# Patient Record
Sex: FEMALE | Race: WHITE | Employment: UNEMPLOYED | URBAN - METROPOLITAN AREA
[De-identification: names, ages, dates, MRNs, and addresses within clinical notes are randomized per-mention and may not be internally consistent; named-entity substitution may affect disease eponyms.]

---

## 2018-11-06 ENCOUNTER — TELEPHONE (OUTPATIENT)
Dept: PHARMACY | Facility: CLINIC | Age: 48
End: 2018-11-06

## 2019-03-13 ENCOUNTER — TELEPHONE (OUTPATIENT)
Dept: NEUROLOGY | Age: 49
End: 2019-03-13

## 2022-01-01 ENCOUNTER — HOSPITAL ENCOUNTER (EMERGENCY)
Age: 52
End: 2022-11-11
Attending: EMERGENCY MEDICINE
Payer: MEDICARE

## 2022-01-01 DIAGNOSIS — I46.9 CARDIORESPIRATORY ARREST (HCC): Primary | ICD-10-CM

## 2022-01-01 PROCEDURE — 42809 REMOVE PHARYNX FOREIGN BODY: CPT

## 2022-01-01 PROCEDURE — 92950 HEART/LUNG RESUSCITATION CPR: CPT

## 2022-01-01 PROCEDURE — 31500 INSERT EMERGENCY AIRWAY: CPT

## 2022-01-01 PROCEDURE — 99283 EMERGENCY DEPT VISIT LOW MDM: CPT

## 2022-07-15 LAB
ANION GAP SERPL CALCULATED.3IONS-SCNC: 11 MMOL/L (ref 7–16)
BASOPHILS ABSOLUTE: 0.06 E9/L (ref 0–0.2)
BASOPHILS RELATIVE PERCENT: 0.7 % (ref 0–2)
BUN BLDV-MCNC: 13 MG/DL (ref 6–20)
CALCIUM SERPL-MCNC: 9.1 MG/DL (ref 8.6–10.2)
CHLORIDE BLD-SCNC: 105 MMOL/L (ref 98–107)
CHOLESTEROL, TOTAL: 154 MG/DL (ref 0–199)
CO2: 27 MMOL/L (ref 22–29)
CREAT SERPL-MCNC: 0.8 MG/DL (ref 0.5–1)
EOSINOPHILS ABSOLUTE: 0.2 E9/L (ref 0.05–0.5)
EOSINOPHILS RELATIVE PERCENT: 2.4 % (ref 0–6)
GFR AFRICAN AMERICAN: >60
GFR NON-AFRICAN AMERICAN: >60 ML/MIN/1.73
GLUCOSE BLD-MCNC: 80 MG/DL (ref 74–99)
HBA1C MFR BLD: 5.3 % (ref 4–5.6)
HCT VFR BLD CALC: 39.3 % (ref 34–48)
HDLC SERPL-MCNC: 54 MG/DL
HEMOGLOBIN: 12.4 G/DL (ref 11.5–15.5)
IMMATURE GRANULOCYTES #: 0.03 E9/L
IMMATURE GRANULOCYTES %: 0.4 % (ref 0–5)
LDL CHOLESTEROL CALCULATED: 74 MG/DL (ref 0–99)
LYMPHOCYTES ABSOLUTE: 3.01 E9/L (ref 1.5–4)
LYMPHOCYTES RELATIVE PERCENT: 36.4 % (ref 20–42)
MCH RBC QN AUTO: 28 PG (ref 26–35)
MCHC RBC AUTO-ENTMCNC: 31.6 % (ref 32–34.5)
MCV RBC AUTO: 88.7 FL (ref 80–99.9)
MONOCYTES ABSOLUTE: 0.51 E9/L (ref 0.1–0.95)
MONOCYTES RELATIVE PERCENT: 6.2 % (ref 2–12)
NEUTROPHILS ABSOLUTE: 4.45 E9/L (ref 1.8–7.3)
NEUTROPHILS RELATIVE PERCENT: 53.9 % (ref 43–80)
PDW BLD-RTO: 14.3 FL (ref 11.5–15)
PLATELET # BLD: 240 E9/L (ref 130–450)
PMV BLD AUTO: 11.1 FL (ref 7–12)
POTASSIUM SERPL-SCNC: 3.6 MMOL/L (ref 3.5–5)
RBC # BLD: 4.43 E12/L (ref 3.5–5.5)
SODIUM BLD-SCNC: 143 MMOL/L (ref 132–146)
TRIGL SERPL-MCNC: 128 MG/DL (ref 0–149)
TSH SERPL DL<=0.05 MIU/L-ACNC: 1.26 UIU/ML (ref 0.27–4.2)
VITAMIN D 25-HYDROXY: 59 NG/ML (ref 30–100)
VLDLC SERPL CALC-MCNC: 26 MG/DL
WBC # BLD: 8.3 E9/L (ref 4.5–11.5)

## 2022-07-18 LAB — KEPPRA: 61 UG/ML (ref 10–40)

## 2022-09-04 LAB — KEPPRA: 41 UG/ML (ref 10–40)

## 2022-11-11 NOTE — CARE COORDINATION
Social Work/ Transition of Care:    Pt presents to the ED secondary to cardiac arrest at [address] [place] [address]. Per report, pt was choking at the facility. Pt has hx of MS. Pt  in ED. [place] notified.

## 2022-11-11 NOTE — ED NOTES
Call received from [employer], they will not be following pt, ok to release      Peggy Dial RN  11/11/22 9521

## 2022-11-11 NOTE — ED PROVIDER NOTES
HPI  45 y.o. female brought by EMS in cardiac arrest. Symptoms have been present for 35-40 minutes prior to arrival, began suddenly. . They have been constant and severe in severity. They are worsened by nothing and relieved by nothing. Per report, patient choked on food at nursing facility and went into cardiac arrest. Compressions started. Patient given multiple rounds of epinephrine and was defibrillated. Intubation was attempted by EMS but was unsuccessful    --------------------------------------------- PAST HISTORY ---------------------------------------------  Past Medical History:  has a past medical history of Balance disorder, Hyperlipidemia, Memory loss, MS (multiple sclerosis) ([facility] [place] [address] Seizures ([facility] [place] [address] and Sleep disorder. Past Surgical History:  has a past surgical history that includes Cholecystectomy. Social History:  reports that she quit smoking about 12 years ago. She smoked an average of 1 pack per day. She has never used smokeless tobacco. She reports that she does not drink alcohol and does not use drugs. Family History: family history includes Diabetes in her father. The patients home medications have been reviewed. Allergies: Patient has no known allergies. Review of Systems   Unable to perform ROS: Acuity of condition      Physical Exam  Constitutional:       General: She is in acute distress. HENT:      Head: Normocephalic and atraumatic. Comments: Food bolus in the oropharynx, removed with forceps  Eyes:      Comments: Pupils fixed   Cardiovascular:      Comments: No pulses detectable  Pulmonary:      Comments: Food bolus in oropharnyx  Abdominal:      General: There is no distension. Palpations: Abdomen is soft. Musculoskeletal:         General: No deformity or signs of injury. Skin:     Coloration: Skin is not jaundiced.       Comments: Extremities cool   Neurological:      Comments: GCS 3   Psychiatric:      Comments: Unresponsive          Procedures Intubation Procedure Note    Indication: Respiratory failure and comatose state    Consent: Unable to be obtained due to the emergent nature of this procedure. Medications Used: None    Procedure: The patient was placed in the appropriate position. Cricoid pressure was not required. Intubation was performed Cmac a 7.5 cuffed endotracheal tube. The cuff was then inflated and the tube was secured appropriately at a distance of 22 cm to the dental ridge. Initial confirmation of placement included bilateral breath sounds, absence of sounds over the stomach, tube fogging, adequate chest rise, and adequate pulse oximetry reading. A chest x-ray to verify correct placement of the tube was not ordered. The patient tolerated the procedure well. Complications: None    PROCEDURE  22       Time: 1415    FOREIGN BODY REMOVAL  Risks, benefits and alternatives (for applicable procedures below) described. Performed By: Breezy Armstrong MD.    Location:   Foreign body of pharynx  Informed consent: Consent unable to be obtained due to patient's condition. .  The patient's head was positioned appropriately and the foreign body was removed using Macgills. .  Complications: none. Patient tolerated the procedure well. ED Course as of 22 1614      1430 Patient pronounced . I spoke with the daughter who is in the emergency department and who was updated regarding this. [SO]   [phone] Spoke with the patient's brother who is now in the emergency department. Additional questions answered for the patient's daughter. Awaiting phone call back from the . [SO]      ED Course User Index  [SO] Eveline Cushing, DO       -------------------------------------------------- RESULTS -------------------------------------------------  Labs:  No results found for this visit on 22.     Radiology:  No orders to display       ------------------------- NURSING NOTES AND VITALS REVIEWED ---------------------------  Date / Time Roomed:  2022  2:24 PM  ED Bed Assignment:      The nursing notes within the ED encounter and vital signs as below have been reviewed. There were no vitals taken for this visit. Oxygen Saturation Interpretation: Abnormal    MDM  Number of Diagnoses or Management Options  Cardiorespiratory arrest [facility])  Diagnosis management comments: 66-year-old female brought by EMS in cardiac arrest after choking episode. Compressions resumed on arrival.  Patient given epi, dextrose, and fluids. Food bolus was removed from airway with MacGill's and patient was intubated on arrival.  Patient remained pulseless and in asystole. Family presented to the ED. Further resuscitation was discontinued. Patient's downtime over 45 minutes. Time of death called [phone].  notified. New Prescriptions    No medications on file       Diagnosis:  1. Cardiorespiratory arrest [facility])        Disposition:  Patient's disposition:         Valentine Cox MD  Resident  22 [id number]      ATTENDING PROVIDER ATTESTATION:     Raj Benítez presented to the emergency department for evaluation of No chief complaint on file. and was initially evaluated by the Medical Resident. See Original ED Note for H&P and ED course above. I have reviewed and discussed the case, including pertinent history (medical, surgical, family and social) and exam findings with the Medical Resident assigned to Raj Jackelin. I have personally performed and/or participated in the history, exam, medical decision making, EKG interpretation and procedures and agree with all pertinent clinical information. I, Dr. Brandon Parmar, am the primary provider of record       I have reviewed my findings and recommendations with the assigned Medical Resident, Raj Benítez and members of family present at the time of disposition.     My findings/plan: The encounter diagnosis was Cardiorespiratory arrest ([facility]   New Prescriptions    No medications on file     Dar Schaeffer, [address], DO  11/11/22 9035